# Patient Record
Sex: MALE | Race: BLACK OR AFRICAN AMERICAN
[De-identification: names, ages, dates, MRNs, and addresses within clinical notes are randomized per-mention and may not be internally consistent; named-entity substitution may affect disease eponyms.]

---

## 2020-08-15 ENCOUNTER — HOSPITAL ENCOUNTER (EMERGENCY)
Dept: HOSPITAL 35 - ER | Age: 75
Discharge: HOME | End: 2020-08-15
Payer: COMMERCIAL

## 2020-08-15 VITALS — HEIGHT: 71 IN | WEIGHT: 220 LBS | BODY MASS INDEX: 30.8 KG/M2

## 2020-08-15 VITALS — SYSTOLIC BLOOD PRESSURE: 123 MMHG | DIASTOLIC BLOOD PRESSURE: 72 MMHG

## 2020-08-15 DIAGNOSIS — J45.909: ICD-10-CM

## 2020-08-15 DIAGNOSIS — Z88.0: ICD-10-CM

## 2020-08-15 DIAGNOSIS — I10: ICD-10-CM

## 2020-08-15 DIAGNOSIS — R10.2: ICD-10-CM

## 2020-08-15 DIAGNOSIS — Z79.899: ICD-10-CM

## 2020-08-15 DIAGNOSIS — Z88.1: ICD-10-CM

## 2020-08-15 DIAGNOSIS — N39.0: Primary | ICD-10-CM

## 2020-08-15 DIAGNOSIS — Z85.46: ICD-10-CM

## 2020-08-15 DIAGNOSIS — R32: ICD-10-CM

## 2020-08-15 DIAGNOSIS — E11.9: ICD-10-CM

## 2020-08-15 LAB
BACTERIA-REFLEX: (no result) /HPF
BILIRUB UR-MCNC: NEGATIVE MG/DL
COLOR UR: YELLOW
KETONES UR STRIP-MCNC: NEGATIVE MG/DL
RBC # UR STRIP: (no result) /UL
RBC #/AREA URNS HPF: (no result) /HPF (ref 0–2)
SP GR UR STRIP: 1.02 (ref 1–1.03)
SQUAMOUS: (no result) /LPF (ref 0–3)
URINE CLARITY: CLEAR
URINE GLUCOSE-RANDOM*: NEGATIVE
URINE LEUKOCYTES-REFLEX: (no result)
URINE NITRITE-REFLEX: NEGATIVE
URINE PROTEIN (DIPSTICK): (no result)
URINE WBC-REFLEX: (no result) /HPF (ref 0–5)
UROBILINOGEN UR STRIP-ACNC: 0.2 E.U./DL (ref 0.2–1)

## 2020-08-17 ENCOUNTER — HOSPITAL ENCOUNTER (INPATIENT)
Dept: HOSPITAL 35 - ER | Age: 75
LOS: 3 days | Discharge: HOME HEALTH | DRG: 871 | End: 2020-08-20
Attending: INTERNAL MEDICINE | Admitting: INTERNAL MEDICINE
Payer: COMMERCIAL

## 2020-08-17 VITALS — HEIGHT: 70.98 IN | WEIGHT: 218 LBS | BODY MASS INDEX: 30.52 KG/M2

## 2020-08-17 VITALS — SYSTOLIC BLOOD PRESSURE: 129 MMHG | DIASTOLIC BLOOD PRESSURE: 62 MMHG

## 2020-08-17 DIAGNOSIS — K59.00: ICD-10-CM

## 2020-08-17 DIAGNOSIS — E11.9: ICD-10-CM

## 2020-08-17 DIAGNOSIS — Z88.0: ICD-10-CM

## 2020-08-17 DIAGNOSIS — Z88.1: ICD-10-CM

## 2020-08-17 DIAGNOSIS — Z88.8: ICD-10-CM

## 2020-08-17 DIAGNOSIS — Z79.899: ICD-10-CM

## 2020-08-17 DIAGNOSIS — K21.9: ICD-10-CM

## 2020-08-17 DIAGNOSIS — I10: ICD-10-CM

## 2020-08-17 DIAGNOSIS — A41.9: Primary | ICD-10-CM

## 2020-08-17 DIAGNOSIS — J45.909: ICD-10-CM

## 2020-08-17 DIAGNOSIS — R33.9: ICD-10-CM

## 2020-08-17 DIAGNOSIS — E43: ICD-10-CM

## 2020-08-17 DIAGNOSIS — N30.80: ICD-10-CM

## 2020-08-17 DIAGNOSIS — Z85.46: ICD-10-CM

## 2020-08-17 DIAGNOSIS — G47.00: ICD-10-CM

## 2020-08-17 DIAGNOSIS — N17.9: ICD-10-CM

## 2020-08-17 LAB
ALBUMIN SERPL-MCNC: 2.6 G/DL (ref 3.4–5)
ALT SERPL-CCNC: 37 U/L (ref 30–65)
ANION GAP SERPL CALC-SCNC: 7 MMOL/L (ref 7–16)
AST SERPL-CCNC: 17 U/L (ref 15–37)
BASOPHILS NFR BLD AUTO: 0.6 % (ref 0–2)
BILIRUB SERPL-MCNC: 0.5 MG/DL (ref 0.2–1)
BILIRUB UR-MCNC: NEGATIVE MG/DL
BUN SERPL-MCNC: 25 MG/DL (ref 7–18)
CALCIUM SERPL-MCNC: 8.9 MG/DL (ref 8.5–10.1)
CHLORIDE SERPL-SCNC: 97 MMOL/L (ref 98–107)
CO2 SERPL-SCNC: 27 MMOL/L (ref 21–32)
COLOR UR: YELLOW
CREAT SERPL-MCNC: 1.4 MG/DL (ref 0.7–1.3)
EOSINOPHIL NFR BLD: 2.3 % (ref 0–3)
ERYTHROCYTE [DISTWIDTH] IN BLOOD BY AUTOMATED COUNT: 19.2 % (ref 10.5–14.5)
GLUCOSE SERPL-MCNC: 143 MG/DL (ref 74–106)
GRANULOCYTES NFR BLD MANUAL: 78.5 % (ref 36–66)
HCT VFR BLD CALC: 32.3 % (ref 42–52)
HGB BLD-MCNC: 11 GM/DL (ref 14–18)
KETONES UR STRIP-MCNC: NEGATIVE MG/DL
LYMPHOCYTES NFR BLD AUTO: 8.4 % (ref 24–44)
MCH RBC QN AUTO: 24.7 PG (ref 26–34)
MCHC RBC AUTO-ENTMCNC: 34 G/DL (ref 28–37)
MCV RBC: 72.5 FL (ref 80–100)
MONOCYTES NFR BLD: 10.2 % (ref 1–8)
MUCUS: (no result) STRN/LPF
NEUTROPHILS # BLD: 11.4 THOU/UL (ref 1.4–8.2)
PLATELET # BLD: 312 THOU/UL (ref 150–400)
POTASSIUM SERPL-SCNC: 5.7 MMOL/L (ref 3.5–5.1)
PROT SERPL-MCNC: 7.8 G/DL (ref 6.4–8.2)
RBC # BLD AUTO: 4.46 MIL/UL (ref 4.5–6)
RBC # UR STRIP: (no result) /UL
RBC #/AREA URNS HPF: (no result) /HPF (ref 0–2)
SODIUM SERPL-SCNC: 131 MMOL/L (ref 136–145)
SP GR UR STRIP: 1.01 (ref 1–1.03)
URINE CLARITY: (no result)
URINE GLUCOSE-RANDOM*: NEGATIVE
URINE LEUKOCYTES-REFLEX: (no result)
URINE NITRITE-REFLEX: NEGATIVE
URINE PROTEIN (DIPSTICK): (no result)
URINE WBC-REFLEX: (no result) /HPF (ref 0–5)
UROBILINOGEN UR STRIP-ACNC: 0.2 E.U./DL (ref 0.2–1)
WBC # BLD AUTO: 14.5 THOU/UL (ref 4–11)

## 2020-08-17 PROCEDURE — 10195: CPT

## 2020-08-18 VITALS — DIASTOLIC BLOOD PRESSURE: 83 MMHG | SYSTOLIC BLOOD PRESSURE: 144 MMHG

## 2020-08-18 VITALS — DIASTOLIC BLOOD PRESSURE: 64 MMHG | SYSTOLIC BLOOD PRESSURE: 138 MMHG

## 2020-08-18 VITALS — SYSTOLIC BLOOD PRESSURE: 127 MMHG | DIASTOLIC BLOOD PRESSURE: 68 MMHG

## 2020-08-18 VITALS — SYSTOLIC BLOOD PRESSURE: 148 MMHG | DIASTOLIC BLOOD PRESSURE: 82 MMHG

## 2020-08-18 VITALS — DIASTOLIC BLOOD PRESSURE: 83 MMHG | SYSTOLIC BLOOD PRESSURE: 166 MMHG

## 2020-08-18 VITALS — SYSTOLIC BLOOD PRESSURE: 123 MMHG | DIASTOLIC BLOOD PRESSURE: 67 MMHG

## 2020-08-18 LAB
ANION GAP SERPL CALC-SCNC: 7 MMOL/L (ref 7–16)
BUN SERPL-MCNC: 22 MG/DL (ref 7–18)
CALCIUM SERPL-MCNC: 9.2 MG/DL (ref 8.5–10.1)
CHLORIDE SERPL-SCNC: 98 MMOL/L (ref 98–107)
CO2 SERPL-SCNC: 28 MMOL/L (ref 21–32)
CREAT SERPL-MCNC: 1.3 MG/DL (ref 0.7–1.3)
ERYTHROCYTE [DISTWIDTH] IN BLOOD BY AUTOMATED COUNT: 19.3 % (ref 10.5–14.5)
GLUCOSE SERPL-MCNC: 102 MG/DL (ref 74–106)
HCT VFR BLD CALC: 34.6 % (ref 42–52)
HGB BLD-MCNC: 11 GM/DL (ref 14–18)
MCH RBC QN AUTO: 23.6 PG (ref 26–34)
MCHC RBC AUTO-ENTMCNC: 31.6 G/DL (ref 28–37)
MCV RBC: 74.8 FL (ref 80–100)
PLATELET # BLD: 335 THOU/UL (ref 150–400)
POTASSIUM SERPL-SCNC: 5.4 MMOL/L (ref 3.5–5.1)
RBC # BLD AUTO: 4.63 MIL/UL (ref 4.5–6)
SODIUM SERPL-SCNC: 133 MMOL/L (ref 136–145)
WBC # BLD AUTO: 12.2 THOU/UL (ref 4–11)

## 2020-08-18 NOTE — NUR
PT TO UNIT AROUND 0345. A&OX4. ORIENTED PT TO UNIT, STAFF AND USE OF CALLIGHT.
PT REPORTS PAIN 5/10 IN ABDOMEN, THINKS HE NEEDS TO HAVE A GOOD BOWEL
MOVEMENT. FLUIDS AND ANTIBIOTICS STARTED. GAIT IS UNSTEADY WITH TRANSFERS.
USING URINAL. HAS SIIFICANT DRIBBLING OF URINE. URINE IS BLOOD TINGED AND FOUL
SMELLING. VSS. PT REPORTS HE HAS SLEEP APNEA BUT DOES NOT WEAR A CPAP OR
BIPAP. ADMISSION COMPLETED. CURRENTLY SNORING IN BED WITH TV ON, WILL CONTINUE
TO MONITOR.

## 2020-08-18 NOTE — NUR
Assumed care of pt at 0700. Pt a&ox4. States he feels constipated. Mag citrate
and supposity ordered. Pt trying to have a BM. Urinary frequency. IVF and
antibiotics infusing. Call light within reach. Fall precautions in place. Will
continue to monitor.

## 2020-08-18 NOTE — EKG
[facility]
[address]
[place]   [address]                     ELECTROCARDIOGRAM REPORT      
_______________________________________________________________________________
 
Name:       ROSA GONZALEZ               Room #:         442-       ADM IN  
M.R.#:      [id number]                       Account #:      [id number]  
Admission:  20    Attend Phys:    Ashlyn Schultz
Discharge:              Date of Birth:  [birth year]  
                                                          Report #: [id number]
                                                                    [id number]
_______________________________________________________________________________
THIS REPORT FOR:  
 
cc:  Malick Waite MD, Washington S. MD Couchonnal, Luis F. MD                                            ~
THIS REPORT FOR:   //name//                          
 
                         [facility] ED
                                       
Test Date:    2020               Test Time:    22:45:23
Pat Name:     ROSA GONZALEZ          Department:   
Patient ID:   [id number]            Room:         [facility]
Gender:       M                        Technician:   [facility] Campuscarmela
:          1945               Requested By: Giancarlo Mondragon
Order Number: [id number] MD:   Joaquin Cooper
                                 Measurements
Intervals                              Axis          
Rate:         95                       P:            49
OK:           137                      QRS:          -66
QRSD:         136                      T:            43
QT:           372                                    
QTc:          468                                    
                           Interpretive Statements
Sinus rhythm
RBBB and LAFB
Minimal ST elevation, anterolateral leads
Compared to ECG 10/06/1992 09:07:00
Left anterior fascicular block now present
Right bundle-branch block now present
ST (T wave) deviation now present
T-wave abnormality no longer present
Electronically Signed On 2020 16:05:59 CDT by Joaquin Cooper
[email or web address]
 
 
 
 
 
 
 
 
 
 
 
  <ELECTRONICALLY SIGNED>
   By: Joaquin Cooper MD        
  20     1605
D: 205                           _____________________________________
T: 20 2245                           Joaquin Cooper MD          /EPI

## 2020-08-18 NOTE — NUR
ASSESSMENT: CM REVIEWED CHART AND SPOKE WITH PATIENT. PT IS ALERT AND ORIENTED
X4. PT WAS ADMITTED FOR COMPLICATED UTI. PT STATES HE LIVES AT HOME WITH HIS
WIFE IN A HOUSE. PT REPORTS THEY HAVE ABOUT 2 STEPS TO ENTER AND HE HAS A
STAIR LIFT INSIDE. PT REPORTS THAT HE USES A FWW FOR AMBULATION. PT REPORTS
THAT HE IS CURRENTLY IN SERVICES WITH [facility]. CM CONTACTED [facility]
AND CONFIRMED THIS AND FAXED REFERRAL TO THEM -[phone]. PT IS HOPEFUL
HE WILL BE ABLE TO RETURN HOME WITH HH AT DISCHARGE. CM ASKED IF PATIENT HAD
BEEN TO A SNF BEFORE AND PT STATES HE WAS UNSURE BUT THOUGHT [facility] HAD ONE HE
HAD BEEN TO IN THE PAST. CM WILL CONTINUE TO FOLLOW TO ASSIST AS NEEDED.

## 2020-08-19 VITALS — SYSTOLIC BLOOD PRESSURE: 143 MMHG | DIASTOLIC BLOOD PRESSURE: 80 MMHG

## 2020-08-19 VITALS — DIASTOLIC BLOOD PRESSURE: 72 MMHG | SYSTOLIC BLOOD PRESSURE: 138 MMHG

## 2020-08-19 VITALS — SYSTOLIC BLOOD PRESSURE: 148 MMHG | DIASTOLIC BLOOD PRESSURE: 72 MMHG

## 2020-08-19 VITALS — SYSTOLIC BLOOD PRESSURE: 133 MMHG | DIASTOLIC BLOOD PRESSURE: 70 MMHG

## 2020-08-19 LAB
ALBUMIN SERPL-MCNC: 2.6 G/DL (ref 3.4–5)
ANION GAP SERPL CALC-SCNC: 7 MMOL/L (ref 7–16)
BUN SERPL-MCNC: 17 MG/DL (ref 7–18)
CALCIUM SERPL-MCNC: 8.8 MG/DL (ref 8.5–10.1)
CHLORIDE SERPL-SCNC: 101 MMOL/L (ref 98–107)
CO2 SERPL-SCNC: 27 MMOL/L (ref 21–32)
CREAT SERPL-MCNC: 1.2 MG/DL (ref 0.7–1.3)
GLUCOSE SERPL-MCNC: 107 MG/DL (ref 74–106)
MAGNESIUM SERPL-MCNC: 2.1 MG/DL (ref 1.8–2.4)
PHOSPHATE SERPL-MCNC: 3.4 MG/DL (ref 2.5–4.9)
POTASSIUM SERPL-SCNC: 5.5 MMOL/L (ref 3.5–5.1)
SODIUM SERPL-SCNC: 135 MMOL/L (ref 136–145)

## 2020-08-19 NOTE — NUR
ASSESSED AT START OF SHIFT. PT A&O4 C/O PAIN 7/1O MANAGED WITH PO TRAMADOL. IV
INTACT AND FLUIDS INFUSING. PT UP WITH ASSISTX1 TO THE BSC HAD 2 BM THIS
SHIFT. EVENING MEDS GIVEN AND PT TOÑITO IT WELL. FALL PREC IN PLACE, CALL LIGHT
IN REACH WILL CONT TO MONITOR.

## 2020-08-19 NOTE — NUR
PT IS AOX4, VSS, NO C/O PAIN AT THIS TIME. PT WORKED WITH PHYSICAL THERAPY
TODAY. APPETITE IS ADEQUATE. PT IS DRIBBLING URINE, REPORTS HE HAS TO URINATE
ALL THE TIME. PT HAS ON BRIEF. IV PATENT IN RIGHT HAND/SL. PT CALLS
APPROPRIATELY. WILL CONTINUE TO MONITOR.

## 2020-08-19 NOTE — NUR
ON-GOING ASSESSMENT: CM REVIEWED CHART AND SPOKE WITH ATTENDING. PT STILL ON
IV VANC FOR COMPLICATED CYSTITIS.  PT IS SLOWLY PROGRESSING TOWARDS DISCHARGE
GOALS. CM SPOKE WITH ATTENDING WHO REPORTS PATIENT MAY POSSIBLY DISCHARGE
TOMORROW BACK HOME WITH [facility] WHO HE WAS IN SERVICE WITH. CM
CONTACTED [facility] TO NOTIFY THEM OF POSSIBLE DISCHARGE TOMORROW (THEY CAN
ACCEPT). CM WILL CONTINUE TO FOLLOW TO ASSIST AS NEEDED.

## 2020-08-20 VITALS — SYSTOLIC BLOOD PRESSURE: 157 MMHG | DIASTOLIC BLOOD PRESSURE: 95 MMHG

## 2020-08-20 VITALS — SYSTOLIC BLOOD PRESSURE: 152 MMHG | DIASTOLIC BLOOD PRESSURE: 88 MMHG

## 2020-08-20 LAB
ALBUMIN SERPL-MCNC: 2.7 G/DL (ref 3.4–5)
ANION GAP SERPL CALC-SCNC: 9 MMOL/L (ref 7–16)
BUN SERPL-MCNC: 14 MG/DL (ref 7–18)
CALCIUM SERPL-MCNC: 9.2 MG/DL (ref 8.5–10.1)
CHLORIDE SERPL-SCNC: 98 MMOL/L (ref 98–107)
CO2 SERPL-SCNC: 26 MMOL/L (ref 21–32)
CREAT SERPL-MCNC: 1.2 MG/DL (ref 0.7–1.3)
GLUCOSE SERPL-MCNC: 131 MG/DL (ref 74–106)
PHOSPHATE SERPL-MCNC: 3.5 MG/DL (ref 2.5–4.9)
POTASSIUM SERPL-SCNC: 4.8 MMOL/L (ref 3.5–5.1)
SODIUM SERPL-SCNC: 133 MMOL/L (ref 136–145)

## 2020-08-20 NOTE — NUR
ASSESSED AT START OF SHIFT 1900. PT C/O OF PAIN IN GROIN AND FREQUENT
URINATION. TRAMADOL GIVEN AND PT HAS ON BRIEFS FOR DRIBBLING. IV INTACT AND
FLUIDS INFUSING. FALL PREC IN PLACE, CALL LIGHT IN REACH WILL CONT TO MONITOR.

## 2020-08-20 NOTE — NUR
PT IS AOX4, VSS, PAIN CONTROLLED WITH ORAL ANALGESIC. PT RECEIVED DC
INSTRUCTIONS, VERBALIZED UNDERSTANDING. PT CALLS APPROPRIATELY, IV DC'D. WIFE
TOOK HOME WITH HOME HEALTH.

## 2020-08-20 NOTE — NUR
PT DISCHARGING TODAY TO HOME WITH [facility] FAXED DC ORDERS/SUMMARY
RECEIVED CONFIRMATION AND THEY WILL CALL PT TO SET UP VISITS.

## 2020-10-29 ENCOUNTER — HOSPITAL ENCOUNTER (EMERGENCY)
Dept: HOSPITAL 35 - ER | Age: 75
Discharge: HOME | End: 2020-10-29
Payer: COMMERCIAL

## 2020-10-29 VITALS — BODY MASS INDEX: 29.8 KG/M2 | WEIGHT: 220 LBS | HEIGHT: 72 IN

## 2020-10-29 VITALS — SYSTOLIC BLOOD PRESSURE: 138 MMHG | DIASTOLIC BLOOD PRESSURE: 84 MMHG

## 2020-10-29 DIAGNOSIS — Z88.8: ICD-10-CM

## 2020-10-29 DIAGNOSIS — I10: ICD-10-CM

## 2020-10-29 DIAGNOSIS — Z88.0: ICD-10-CM

## 2020-10-29 DIAGNOSIS — D64.9: ICD-10-CM

## 2020-10-29 DIAGNOSIS — Z88.1: ICD-10-CM

## 2020-10-29 DIAGNOSIS — E11.9: ICD-10-CM

## 2020-10-29 DIAGNOSIS — Z79.899: ICD-10-CM

## 2020-10-29 DIAGNOSIS — J45.909: ICD-10-CM

## 2020-10-29 DIAGNOSIS — K56.41: Primary | ICD-10-CM

## 2020-10-29 LAB
%HYPO/RBC NFR BLD AUTO: (no result) %
ALBUMIN SERPL-MCNC: 2.9 G/DL (ref 3.4–5)
ALT SERPL-CCNC: 55 U/L (ref 30–65)
ANION GAP SERPL CALC-SCNC: 11 MMOL/L (ref 7–16)
ANISOCYTOSIS BLD QL SMEAR: (no result)
AST SERPL-CCNC: 49 U/L (ref 15–37)
BACTERIA-REFLEX: (no result) /HPF
BASOPHILS NFR BLD AUTO: 0.5 % (ref 0–2)
BILIRUB SERPL-MCNC: 0.3 MG/DL (ref 0.2–1)
BILIRUB UR-MCNC: NEGATIVE MG/DL
BUN SERPL-MCNC: 21 MG/DL (ref 7–18)
CALCIUM SERPL-MCNC: 9.4 MG/DL (ref 8.5–10.1)
CHLORIDE SERPL-SCNC: 97 MMOL/L (ref 98–107)
CO2 SERPL-SCNC: 25 MMOL/L (ref 21–32)
COLOR UR: YELLOW
CREAT SERPL-MCNC: 1.4 MG/DL (ref 0.7–1.3)
EOSINOPHIL NFR BLD: 0.7 % (ref 0–3)
ERYTHROCYTE [DISTWIDTH] IN BLOOD BY AUTOMATED COUNT: 19.7 % (ref 10.5–14.5)
GLUCOSE SERPL-MCNC: 106 MG/DL (ref 74–106)
GRANULOCYTES NFR BLD MANUAL: 82.1 % (ref 36–66)
HCT VFR BLD CALC: 28.1 % (ref 42–52)
HGB BLD-MCNC: 9 GM/DL (ref 14–18)
KETONES UR STRIP-MCNC: NEGATIVE MG/DL
LIPASE: 78 U/L (ref 73–393)
LYMPHOCYTES NFR BLD AUTO: 9.2 % (ref 24–44)
MCH RBC QN AUTO: 22.2 PG (ref 26–34)
MCHC RBC AUTO-ENTMCNC: 31.9 G/DL (ref 28–37)
MCV RBC: 69.6 FL (ref 80–100)
MICROCYTES: (no result)
MONOCYTES NFR BLD: 7.5 % (ref 1–8)
MUCUS: (no result) STRN/LPF
NEUTROPHILS # BLD: 8.2 THOU/UL (ref 1.4–8.2)
PLATELET # BLD: 422 THOU/UL (ref 150–400)
POTASSIUM SERPL-SCNC: 4.5 MMOL/L (ref 3.5–5.1)
PROT SERPL-MCNC: 8.2 G/DL (ref 6.4–8.2)
RBC # BLD AUTO: 4.04 MIL/UL (ref 4.5–6)
RBC # UR STRIP: (no result) /UL
SODIUM SERPL-SCNC: 133 MMOL/L (ref 136–145)
SP GR UR STRIP: 1.01 (ref 1–1.03)
SQUAMOUS: (no result) /LPF (ref 0–3)
TROPONIN I SERPL-MCNC: <0.06 NG/ML (ref ?–0.06)
URINE CLARITY: (no result)
URINE GLUCOSE-RANDOM*: NEGATIVE
URINE LEUKOCYTES-REFLEX: (no result)
URINE NITRITE-REFLEX: NEGATIVE
URINE PROTEIN (DIPSTICK): (no result)
URINE WBC-REFLEX: (no result) /HPF (ref 0–5)
UROBILINOGEN UR STRIP-ACNC: 0.2 E.U./DL (ref 0.2–1)
WBC # BLD AUTO: 10 THOU/UL (ref 4–11)

## 2020-10-30 NOTE — EKG
[facility]
[address]
[place]   [address]                     ELECTROCARDIOGRAM REPORT      
_______________________________________________________________________________
 
Name:       ROSA GONAZLEZ               [id number]                       Account #:      [id number]  
Admission:  10/29/20    Attend Phys:                          
Discharge:  10/29/20    Date of Birth:  [birth year]  
                                                          Report #: [id number]
                                                                    [id number]
_______________________________________________________________________________
THIS REPORT FOR:  
 
cc:  Malick Waite MD, Washington S. MD Santiago, Patrick MD [facility]                                         ~
THIS REPORT FOR:   //name//                          
 
                         [facility] ED
                                       
Test Date:    2020-10-29               Test Time:    18:10:39
Pat Name:     ROSA GONZALEZ          Department:   
Patient ID:   [id number]            Room:          
Gender:                               Technician:   [facility]
:          1945               Requested By: Milla Marks
Order Number: [id number] MD:   Rambo Cuba
                                 Measurements
Intervals                              Axis          
Rate:         115                      P:            82
NC:           149                      QRS:          -61
QRSD:         139                      T:            69
QT:           341                                    
QTc:          472                                    
                           Interpretive Statements
Sinus tachycardia
RBBB and LAFB
Compared to ECG 2020 22:45:23
Sinus rhythm no longer present
ST (T wave) deviation still present
Electronically Signed On 10- 7:41:20 CDT by Rambo Cuba
[email or web address]
 
 
 
 
 
 
 
 
 
 
 
 
 
 
  <ELECTRONICALLY SIGNED>
   By: Rambo Cuba MD, FACC    
  10/30/20     0741
D: 10/29/20 1810                           _____________________________________
T: 10/29/20 1810                           Rambo Cuba MD, FAC      /EPI